# Patient Record
Sex: MALE | Race: WHITE | NOT HISPANIC OR LATINO | Employment: FULL TIME | URBAN - METROPOLITAN AREA
[De-identification: names, ages, dates, MRNs, and addresses within clinical notes are randomized per-mention and may not be internally consistent; named-entity substitution may affect disease eponyms.]

---
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
## 2021-01-27 ENCOUNTER — HOSPITAL ENCOUNTER (EMERGENCY)
Facility: MEDICAL CENTER | Age: 21
End: 2021-01-28
Attending: EMERGENCY MEDICINE
Payer: MEDICAID

## 2021-01-27 DIAGNOSIS — R51.9 ACUTE NONINTRACTABLE HEADACHE, UNSPECIFIED HEADACHE TYPE: ICD-10-CM

## 2021-01-27 PROCEDURE — 99284 EMERGENCY DEPT VISIT MOD MDM: CPT

## 2021-01-27 PROCEDURE — U0005 INFEC AGEN DETEC AMPLI PROBE: HCPCS

## 2021-01-27 PROCEDURE — U0003 INFECTIOUS AGENT DETECTION BY NUCLEIC ACID (DNA OR RNA); SEVERE ACUTE RESPIRATORY SYNDROME CORONAVIRUS 2 (SARS-COV-2) (CORONAVIRUS DISEASE [COVID-19]), AMPLIFIED PROBE TECHNIQUE, MAKING USE OF HIGH THROUGHPUT TECHNOLOGIES AS DESCRIBED BY CMS-2020-01-R: HCPCS

## 2021-01-27 RX ORDER — ONDANSETRON 2 MG/ML
4 INJECTION INTRAMUSCULAR; INTRAVENOUS ONCE
Status: COMPLETED | OUTPATIENT
Start: 2021-01-28 | End: 2021-01-28

## 2021-01-27 RX ORDER — SODIUM CHLORIDE 9 MG/ML
1000 INJECTION, SOLUTION INTRAVENOUS ONCE
Status: COMPLETED | OUTPATIENT
Start: 2021-01-28 | End: 2021-01-28

## 2021-01-27 RX ORDER — METOCLOPRAMIDE HYDROCHLORIDE 5 MG/ML
10 INJECTION INTRAMUSCULAR; INTRAVENOUS ONCE
Status: COMPLETED | OUTPATIENT
Start: 2021-01-28 | End: 2021-01-28

## 2021-01-27 RX ORDER — ACETAMINOPHEN 325 MG/1
650 TABLET ORAL ONCE
Status: COMPLETED | OUTPATIENT
Start: 2021-01-28 | End: 2021-01-28

## 2021-01-28 ENCOUNTER — APPOINTMENT (OUTPATIENT)
Dept: RADIOLOGY | Facility: MEDICAL CENTER | Age: 21
End: 2021-01-28
Attending: EMERGENCY MEDICINE
Payer: MEDICAID

## 2021-01-28 VITALS
HEART RATE: 74 BPM | OXYGEN SATURATION: 94 % | DIASTOLIC BLOOD PRESSURE: 66 MMHG | TEMPERATURE: 97.5 F | SYSTOLIC BLOOD PRESSURE: 112 MMHG | BODY MASS INDEX: 25.52 KG/M2 | RESPIRATION RATE: 16 BRPM | HEIGHT: 71 IN

## 2021-01-28 LAB
SARS-COV-2 RNA RESP QL NAA+PROBE: NOTDETECTED
SPECIMEN SOURCE: NORMAL

## 2021-01-28 PROCEDURE — 96374 THER/PROPH/DIAG INJ IV PUSH: CPT

## 2021-01-28 PROCEDURE — 700111 HCHG RX REV CODE 636 W/ 250 OVERRIDE (IP): Performed by: EMERGENCY MEDICINE

## 2021-01-28 PROCEDURE — 700102 HCHG RX REV CODE 250 W/ 637 OVERRIDE(OP): Performed by: EMERGENCY MEDICINE

## 2021-01-28 PROCEDURE — 70450 CT HEAD/BRAIN W/O DYE: CPT

## 2021-01-28 PROCEDURE — 96375 TX/PRO/DX INJ NEW DRUG ADDON: CPT

## 2021-01-28 PROCEDURE — 700105 HCHG RX REV CODE 258: Performed by: EMERGENCY MEDICINE

## 2021-01-28 PROCEDURE — A9270 NON-COVERED ITEM OR SERVICE: HCPCS | Performed by: EMERGENCY MEDICINE

## 2021-01-28 RX ADMIN — ACETAMINOPHEN 650 MG: 325 TABLET, FILM COATED ORAL at 00:00

## 2021-01-28 RX ADMIN — ONDANSETRON 4 MG: 2 INJECTION INTRAMUSCULAR; INTRAVENOUS at 00:05

## 2021-01-28 RX ADMIN — METOCLOPRAMIDE 10 MG: 5 INJECTION, SOLUTION INTRAMUSCULAR; INTRAVENOUS at 00:05

## 2021-01-28 RX ADMIN — SODIUM CHLORIDE 1000 ML: 9 INJECTION, SOLUTION INTRAVENOUS at 00:05

## 2021-01-28 NOTE — ED PROVIDER NOTES
"ED Provider Note    Scribed for Murtaza Lanier M.D. by Livia Jennings. 1/27/2021, 11:44 PM.    Primary care provider: Pcp Pt States None  Means of arrival: Walk-in  History obtained from: Patient  History limited by: None    CHIEF COMPLAINT  Chief Complaint   Patient presents with   • Headache       HPI  Manny Cerna is a 20 y.o. male who presents to the Emergency Department for evaluation of an acute headache onset last night. The pain has been worsening since it started. He describes seeing \"spots and squiggly lines\" throughout the last several hours. He endorses associated blurred vision, photophobia, nausea, fatigue, but denies cough, sore throat, abdominal pain, shortness of breath, vomiting, difficulty speaking, extremity weakness. Light exacerbates the pain. He has had a migraine in the past but this feels different. No familial history of migraine. No known COVID exposure.    REVIEW OF SYSTEMS  Pertinent positives include headache, blurred vision, photophobia, nausea, and fatigue. Pertinent negatives include no cough, sore throat, abdominal pain, shortness of breath, vomiting, difficulty speaking, extremity weakness. All other systems negative.    PAST MEDICAL HISTORY   has a past medical history of Bipolar 1 disorder (HCC), Depressed, and Psychiatric disorder.    SURGICAL HISTORY  patient denies any surgical history    SOCIAL HISTORY  Social History     Tobacco Use   • Smoking status: Never Smoker   • Smokeless tobacco: Former User   Substance Use Topics   • Alcohol use: No   • Drug use: No      Social History     Substance and Sexual Activity   Drug Use No       FAMILY HISTORY  History reviewed. No pertinent family history.    CURRENT MEDICATIONS  Current Outpatient Medications   Medication Instructions   • ARIPiprazole (ABILIFY) 10 mg, Oral, DAILY   • methylphenidate (CONCERTA) 27 mg, Oral, EVERY MORNING   • OLANZapine (ZYPREXA PO) Oral   • sertraline (ZOLOFT) 100 mg, Oral, DAILY " "      ALLERGIES  No Known Allergies    PHYSICAL EXAM  VITAL SIGNS: /89   Pulse 86   Temp 36.6 °C (97.8 °F) (Temporal)   Resp 16   Ht 1.803 m (5' 11\")   SpO2 96%   BMI 25.52 kg/m²     Constitutional: Well developed, Well nourished, mild distress.   HENT: Normocephalic, Atraumatic, mask in place.  Eyes: Conjunctiva normal, No discharge.   Neck: Supple, No stridor, no meningismus  Cardiovascular: Normal heart rate, Normal rhythm, No murmurs, equal pulses.   Pulmonary: Normal breath sounds, No respiratory distress, No wheezing, No rales, No rhonchi.  Chest: No chest wall tenderness or deformity.   Abdomen: Soft, No tenderness, No masses, no rebound, no guarding.   Back: No CVA tenderness.   Musculoskeletal: No major deformities noted, No tenderness.   Skin: Warm, Dry, No erythema, No rash.   Neurologic: No facial droop, 5/5 strength in upper and lower extremities. Alert & oriented x 3, Normal motor function,  No focal deficits noted. Normal finger to nose, Normal cranial nerves II-XII, No pronator drift. Equal strength in upper and lower extremities bilaterally.  Psychiatric: Affect normal, Judgment normal, Mood normal.     LABS  Results for orders placed or performed during the hospital encounter of 01/27/21   SARS-CoV-2 PCR (24 hour In-House): Collect NP swab in VTM    Specimen: Respirate   Result Value Ref Range    SARS-CoV-2 Source NP Swab      All labs reviewed by me.    CT-HEAD W/O   Final Result         1.  No acute intracranial abnormality.          COURSE & MEDICAL DECISION MAKING  Pertinent Labs & Imaging studies reviewed. (See chart for details)    PPE Note: I verified that the patient was wearing a mask and I was wearing appropriate PPE every time I entered the room. The patient's mask was on the patient at all times during my encounter except for a brief view of the oropharynx.     Scribe remained outside the patient's room and did not have any contact with the patient for the duration of " patient encounter.     11:44 PM - Patient seen and examined at bedside. Informed patient that he has a migraine and imaging is not necessary at this time. Patient will be treated with Zofran, Moses, and Tylenol. The patient will receive IV fluids for migraine. Ordered SARS-Cov2 to evaluate his symptoms. The differential diagnoses include but are not limited to: migraine, COVID     Reevaluated patient 00:40 patient still states his headache is only down to an 8 out of 10.    There was a positive response to IV fluids after patient reevaluation.    Patient was reevaluated at 2:25 states his headache is gone.  He would like to go home.  Discussed CT results with him that the CT is negative.    Medical Decision Making: Patient presents with a headache with worsening over the last 2 days.  He said it was gradual in onset.  He did have what sounds like a possible aura although it did not start with the initial onset of the headache.  Patient was treated with migraine cocktail with that his headache is gone.  Patient initially did not have much relief from the migraine cocktail given the fact that he stated that he had some visual changes CT of the head was done to make sure he did not have an intracranial hemorrhage or mass.  This is negative.  I do not suspect he has a subarachnoid hemorrhage as headache is not thunderclap in nature.  Patient has no neck stiffness does not have a fever I do not suspect he has meningitis.  We do have a high amount of COVID-19 in the community which can cause significant headaches therefore patient will be tested for COVID-19.     The patient will return for new or worsening symptoms and is stable at the time of discharge.    The patient is referred to a primary physician for blood pressure management, diabetic screening, and for all other preventative health concerns.        DISPOSITION:  Patient will be discharged home in stable condition.    FOLLOW UP:  Your doctor    Schedule an  appointment as soon as possible for a visit   As needed    [address] [address] [phone]    If you need a doctor    [address] [address] [phone]    If you need a doctor      OUTPATIENT MEDICATIONS:  New Prescriptions    No medications on file        FINAL IMPRESSION  1. Acute nonintractable headache, unspecified headache type          I, Livia Jennings (Scribe), am scribing for, and in the presence of, Murtaza Lanier M.D.    Electronically signed by: Livia Jennings (Dejanibnereida), 1/27/2021    IMurtaza M.D. personally performed the services described in this documentation, as scribed by Livia Jennings in my presence, and it is both accurate and complete.      The note accurately reflects work and decisions made by me.  Murtaza Lanier M.D.  1/28/2021  2:28 AM

## 2021-01-28 NOTE — ED TRIAGE NOTES
Headache since last night. Slight blurred vision bilat, photophobia and nausea.  Tried motrin, sleep, food and increasing po fluids without improvement.

## 2021-01-28 NOTE — DISCHARGE INSTRUCTIONS
Return if you have new or different headache, confusion, neck stiffness, fever, weakness, vision changes or difficulty speaking.  Your COVID-19 test should be back and 24 hours you will have to check the [facility] niels for your results.  Tell it is back you should home quarantine.

## 2021-01-28 NOTE — ED NOTES
Pt aox4, skin pink warm and dry, airway patent, rr even and unlabored, nad noted. No new complaints. Pt vss. Ambulates upon discharge without new incident or distress.

## 2021-01-29 ENCOUNTER — APPOINTMENT (OUTPATIENT)
Dept: RADIOLOGY | Facility: MEDICAL CENTER | Age: 21
End: 2021-01-29
Attending: EMERGENCY MEDICINE
Payer: MEDICAID

## 2021-01-29 ENCOUNTER — HOSPITAL ENCOUNTER (EMERGENCY)
Facility: MEDICAL CENTER | Age: 21
End: 2021-01-29
Attending: EMERGENCY MEDICINE
Payer: MEDICAID

## 2021-01-29 VITALS
RESPIRATION RATE: 18 BRPM | WEIGHT: 194.22 LBS | OXYGEN SATURATION: 98 % | BODY MASS INDEX: 27.19 KG/M2 | TEMPERATURE: 98.9 F | DIASTOLIC BLOOD PRESSURE: 59 MMHG | HEIGHT: 71 IN | SYSTOLIC BLOOD PRESSURE: 110 MMHG | HEART RATE: 86 BPM

## 2021-01-29 DIAGNOSIS — S59.911A INJURY OF RIGHT FOREARM, INITIAL ENCOUNTER: ICD-10-CM

## 2021-01-29 PROCEDURE — 99283 EMERGENCY DEPT VISIT LOW MDM: CPT | Mod: EDC

## 2021-01-29 PROCEDURE — 302874 HCHG BANDAGE ACE 2 OR 3"": Mod: EDC

## 2021-01-29 PROCEDURE — 29125 APPL SHORT ARM SPLINT STATIC: CPT | Mod: EDC

## 2021-01-29 PROCEDURE — 73090 X-RAY EXAM OF FOREARM: CPT | Mod: RT

## 2021-01-29 ASSESSMENT — LIFESTYLE VARIABLES
DO YOU DRINK ALCOHOL: NO
DOES PATIENT WANT TO STOP DRINKING: NO

## 2021-01-29 NOTE — ED NOTES
"[facility]    Discharge instructions, which include signs and symptoms to monitor patient for, as well as detailed information regarding forearm injury provided.  All questions and concerns addressed at this time. Encouraged patient to schedule a follow- up appointment with ortho.   Pt's splint checked and approved by ERP prior to discharge, CMS+.    Patient leaves ER in no apparent distress. Provided education regarding returning to the ER for any new concerns or changes in patient's condition.      /59   Pulse 86   Temp 37.2 °C (98.9 °F) (Temporal)   Resp 18   Ht 1.803 m (5' 11\")   Wt 88.1 kg (194 lb 3.6 oz)   SpO2 98%   BMI 27.09 kg/m²     "

## 2021-01-29 NOTE — ED PROVIDER NOTES
ED Provider Note    CHIEF COMPLAINT  Chief Complaint   Patient presents with   • Wrist Pain     right wrist since last night; pt states he was boxing as part of his workout when he started to experiece increased pain, pt denies swelling. Pt denies taking any medicaiton for pain        HPI  Manny Cerna is a 20 y.o. male who presents to the emergency department with pain over the distal aspect of the ulna just above the right wrist after punching a wal last night l.  The patient says that he was wearing boxing gloves and was shadow boxing but he did punch the wall with his right hand and has been persistently uncomfortable since then.  The pain is located just proximal to the wrist over the distal lateral ulnar area.  No elbow pain no pains in the fingers    REVIEW OF SYSTEMS no other injury or mechanism    PAST MEDICAL HISTORY  Past Medical History:   Diagnosis Date   • Bipolar 1 disorder (HCC)    • Depressed    • Psychiatric disorder     Inpatient psych care.        FAMILY HISTORY  History reviewed. No pertinent family history.    SOCIAL HISTORY  Social History     Socioeconomic History   • Marital status: Single     Spouse name: Not on file   • Number of children: Not on file   • Years of education: Not on file   • Highest education level: Not on file   Occupational History   • Not on file   Social Needs   • Financial resource strain: Not on file   • Food insecurity     Worry: Not on file     Inability: Not on file   • Transportation needs     Medical: Not on file     Non-medical: Not on file   Tobacco Use   • Smoking status: Never Smoker   • Smokeless tobacco: Former User   Substance and Sexual Activity   • Alcohol use: No   • Drug use: No   • Sexual activity: Not on file   Lifestyle   • Physical activity     Days per week: Not on file     Minutes per session: Not on file   • Stress: Not on file   Relationships   • Social connections     Talks on phone: Not on file     Gets together: Not on file      "Attends [employer] service: Not on file     Active member of club or organization: Not on file     Attends meetings of clubs or organizations: Not on file     Relationship status: Not on file   • Intimate partner violence     Fear of current or ex partner: Not on file     Emotionally abused: Not on file     Physically abused: Not on file     Forced sexual activity: Not on file   Other Topics Concern   • Not on file   Social History Narrative   • Not on file       SURGICAL HISTORY  History reviewed. No pertinent surgical history.    CURRENT MEDICATIONS  Home Medications     Reviewed by Marguerite Joe R.N. (Registered Nurse) on 01/29/21 at 1047  Med List Status: Complete   Medication Last Dose Status        Patient Adebayo Taking any Medications                       ALLERGIES  No Known Allergies    PHYSICAL EXAM  VITAL SIGNS: /60   Pulse 85   Temp 36 °C (96.8 °F) (Temporal)   Resp 16   Ht 1.803 m (5' 11\")   Wt 88.1 kg (194 lb 3.6 oz)   SpO2 96%   BMI 27.09 kg/m²    Oxygen saturation is interpreted as adequate  Constitutional: Awake pleasant well-appearing individual in no distress  Musculoskeletal: No acute bony deformity but there is tenderness over the ulnar aspect of the distal forearm, not so much in the wrist but mild tenderness throughout the wrist and a little bit of discomfort over the ulnar aspect of the hand but quite minimal and no deformity or swelling over the metacarpals.  Neurologic: Moving all his fingers and thumb without difficulty    Radiology  DX-FOREARM RIGHT   Final Result      No radiographic evidence of acute traumatic injury.        MEDICAL DECISION MAKING and DISPOSITION  I reviewed the x-ray findings with the patient, I do not think that he has a typical boxer's fracture of the hand as the pain is more over the distal part of the forearm, I have ordered a volar splint for the right forearm to keep the area at rest and protected I have spoken to the patient about the possibility " of soft tissue or ligamentous injury or occult fracture and I have advised him to rest ice and elevate the hand and use Tylenol and Motrin for pain and call Dr. Ma's office today and arrange orthopedic recheck during the week next week.    IMPRESSION  1.  Injury to the ulnar aspect of the distal right forearm         Electronically signed by: Heriberto Bui M.D., 1/29/2021 12:25 PM

## 2021-01-29 NOTE — ED NOTES
Volar Splint to right arm. Soft padding applied x 3 to forearm, x 4 to bone prominences. ERP Notified and checked splint. No further concerns.

## 2021-01-29 NOTE — ED TRIAGE NOTES
Manny Chintan Vallesjustina  20 y.o. male  Chief Complaint   Patient presents with   • Wrist Pain     right wrist since last night; pt states he was boxing as part of his workout when he started to experiece increased pain, pt denies swelling. Pt denies taking any medicaiton for pain        Pt amb to triage with steady gait for above complaint.   Pt is alert and oriented, speaking in full sentences, follows commands and responds appropriately to questions. Resp are even and unlabored. No behavioral indicators of pain.   Pt placed in lobby. Pt educated on triage process. Pt encouraged to alert staff for any changes.

## 2021-01-29 NOTE — ED NOTES
Pt reports right wrist pain that radiates down the hand, no obvious swelling or deformity noted. Pt aware to remain NPO, CMS+

## 2021-04-27 ENCOUNTER — HOSPITAL ENCOUNTER (EMERGENCY)
Facility: MEDICAL CENTER | Age: 21
End: 2021-04-27
Attending: EMERGENCY MEDICINE
Payer: MEDICAID

## 2021-04-27 VITALS
HEIGHT: 71 IN | WEIGHT: 190 LBS | BODY MASS INDEX: 26.6 KG/M2 | OXYGEN SATURATION: 98 % | RESPIRATION RATE: 16 BRPM | TEMPERATURE: 99 F | SYSTOLIC BLOOD PRESSURE: 118 MMHG | HEART RATE: 80 BPM | DIASTOLIC BLOOD PRESSURE: 55 MMHG

## 2021-04-27 DIAGNOSIS — S51.812A LACERATION OF LEFT FOREARM, INITIAL ENCOUNTER: ICD-10-CM

## 2021-04-27 DIAGNOSIS — R45.851 SUICIDAL IDEATION: ICD-10-CM

## 2021-04-27 LAB
AMPHET UR QL SCN: NEGATIVE
BARBITURATES UR QL SCN: NEGATIVE
BENZODIAZ UR QL SCN: NEGATIVE
BZE UR QL SCN: NEGATIVE
CANNABINOIDS UR QL SCN: NEGATIVE
METHADONE UR QL SCN: NEGATIVE
OPIATES UR QL SCN: NEGATIVE
OXYCODONE UR QL SCN: NEGATIVE
PCP UR QL SCN: NEGATIVE
POC BREATHALIZER: 0 PERCENT (ref 0–0.01)
PROPOXYPH UR QL SCN: NEGATIVE

## 2021-04-27 PROCEDURE — 302970 POC BREATHALIZER: Performed by: EMERGENCY MEDICINE

## 2021-04-27 PROCEDURE — 90791 PSYCH DIAGNOSTIC EVALUATION: CPT

## 2021-04-27 PROCEDURE — 700111 HCHG RX REV CODE 636 W/ 250 OVERRIDE (IP): Performed by: EMERGENCY MEDICINE

## 2021-04-27 PROCEDURE — 90471 IMMUNIZATION ADMIN: CPT

## 2021-04-27 PROCEDURE — 99285 EMERGENCY DEPT VISIT HI MDM: CPT

## 2021-04-27 PROCEDURE — 80307 DRUG TEST PRSMV CHEM ANLYZR: CPT

## 2021-04-27 PROCEDURE — 304999 HCHG REPAIR-SIMPLE/INTERMED LEVEL 1

## 2021-04-27 PROCEDURE — 700101 HCHG RX REV CODE 250: Performed by: EMERGENCY MEDICINE

## 2021-04-27 PROCEDURE — 303353 HCHG DERMABOND SKIN ADHESIVE

## 2021-04-27 PROCEDURE — 90715 TDAP VACCINE 7 YRS/> IM: CPT | Performed by: EMERGENCY MEDICINE

## 2021-04-27 PROCEDURE — 304217 HCHG IRRIGATION SYSTEM

## 2021-04-27 RX ADMIN — TETRACAINE HCL 3 ML: 10 INJECTION SUBARACHNOID at 01:32

## 2021-04-27 RX ADMIN — CLOSTRIDIUM TETANI TOXOID ANTIGEN (FORMALDEHYDE INACTIVATED), CORYNEBACTERIUM DIPHTHERIAE TOXOID ANTIGEN (FORMALDEHYDE INACTIVATED), BORDETELLA PERTUSSIS TOXOID ANTIGEN (GLUTARALDEHYDE INACTIVATED), BORDETELLA PERTUSSIS FILAMENTOUS HEMAGGLUTININ ANTIGEN (FORMALDEHYDE INACTIVATED), BORDETELLA PERTUSSIS PERTACTIN ANTIGEN, AND BORDETELLA PERTUSSIS FIMBRIAE 2/3 ANTIGEN 0.5 ML: 5; 2; 2.5; 5; 3; 5 INJECTION, SUSPENSION INTRAMUSCULAR at 01:32

## 2021-04-27 NOTE — DISCHARGE PLANNING
Medical Social Work    Referral: Legal Hold    Intervention: Legal Hold Paperwork given to SW by [employer] RN: Zoraida    Legal Hold Initiated: Date: 04/27/2021  Time: 0151    Legal Hold faxed: Date: 04/27/2021  Time: 0304    Patient’s Insurance Listed on Face Sheet: [place] [employer]    Referrals sent to: [facility]    Plan: Patient will transfer to mental health facility once acceptance is obtained.

## 2021-04-27 NOTE — ED PROVIDER NOTES
"ED Provider Note    CHIEF COMPLAINT  Chief Complaint   Patient presents with   • Laceration   • Psych Eval        HPI    Primary care provider: Pcp Pt States None   History obtained from: Patient and staff from his group home  History limited by: None     Manny Cerna is a 20 y.o. male who presents to the ED by EMS from his group home due to laceration to the left forearm shortly prior to arrival.  Patient states that he used a scissor to cut his forearm because \"I was under stress.\"  He has cut himself in the past.  He denies any other acts of self-harm today.  He denies drug or alcohol use.  He denies any weakness or sensory change.  He denies fever/chills/congestion/sore throat/cough/shortness of breath or difficulty breathing/chest pain/abdominal pain/nausea/vomiting/diarrhea/dysuria.  He denies homicidal ideation.  He has been in the group home for about 2 months.    REVIEW OF SYSTEMS  Please see HPI for pertinent positives/negatives.  All other systems reviewed and are negative.     PAST MEDICAL HISTORY  Past Medical History:   Diagnosis Date   • Bipolar 1 disorder (HCC)    • Depressed    • Psychiatric disorder     Inpatient psych care.         SURGICAL HISTORY  History reviewed. No pertinent surgical history.     SOCIAL HISTORY  Social History     Tobacco Use   • Smoking status: Never Smoker   • Smokeless tobacco: Former User   Substance and Sexual Activity   • Alcohol use: No   • Drug use: No   • Sexual activity: Not on file        FAMILY HISTORY  History reviewed. No pertinent family history.     CURRENT MEDICATIONS  Home Medications    **Home medications have not yet been reviewed for this encounter**          ALLERGIES  No Known Allergies     PHYSICAL EXAM  VITAL SIGNS: /93   Pulse 65   Temp 36.6 °C (97.8 °F) (Temporal)   Resp 16   Ht 1.803 m (5' 11\")   Wt 86.2 kg (190 lb)   SpO2 97%   BMI 26.50 kg/m²  @TACHO[[id number]::@     Pulse ox interpretation: 97% I interpret this pulse ox as normal "     Constitutional: Well developed, well nourished, alert in no apparent distress, nontoxic appearance    HENT: No external signs of trauma, normocephalic, mask on due to COVID-19 pandemic  Eyes: PERRL, conjunctiva without erythema, no discharge, no icterus    Neck: Soft and supple, trachea midline, no stridor, no tenderness, no LAD, no JVD, good ROM    Cardiovascular: Regular rate and rhythm, no murmurs/rubs/gallops, strong distal pulses and good perfusion    Thorax & Lungs: No respiratory distress, CTAB   Abdomen: Soft, nontender, nondistended, no guarding, no rebound, normal BS    Back: No CVAT    Extremities: No cyanosis, several linear superficial lacerations on left forearm with mild tenderness to palpation without crepitus/fluctuance/streaking, no edema, no gross deformity, good ROM, sensation intact to touch throughout, distal 5/5 strength, intact distal pulses with brisk cap refill    Skin: Warm, dry, no pallor/cyanosis, no rash noted    Lymphatic: No lymphadenopathy noted    Neuro: Alert and oriented to person, place, and time.  GCS 15.  CN II-XII grossly intact.  Normal speech.  Equal strength bilateral UE/LE.  Sensation intact to touch.  Psychiatric: Cooperative, normal mood and affect, no signs of active hallucinations or delusions      DIAGNOSTIC STUDIES / PROCEDURES    Verbal consent was obtained for laceration repair.  The wound was locally infiltrated with LET then irrigated with NS.  Wound was explored without evidence of FB.  The lacerations were closed using Dermabond.  Pt tolerated procedure well without complications.  Pt instructed on S/S of infection.    Total repaired wound length: 6 cm.      Tetanus updated in the ED      LABS  All labs reviewed by me.     Results for orders placed or performed during the hospital encounter of 04/27/21   POC BREATHALIZER   Result Value Ref Range    POC Breathalizer 0.00 0.00 - 0.01 Percent        RADIOLOGY  The radiologist's interpretation of all  radiological studies have been reviewed by me.     No orders to display          COURSE & MEDICAL DECISION MAKING  Nursing notes, VS, PMSFHx reviewed in chart.     Review of past medical records shows the patient was last seen in this ED January 29, 2021 for pain after punching a wall.      Differential diagnoses considered include but are not limited to: Suicidal ideation/attempt, anxiety, depression, schizophrenia, personality disorder, lacerations      History and physical exam as above.  Patient with self-inflicted lacerations to his left forearm.  These lacerations are mostly superficial and 3 of the lacerations were closed using Dermabond as above without complications.  No evidence for significant neurovascular or tendon injury.  Patient received a tetanus shot in the ED.  Patient was evaluated by mental health and placed on legal hold.  He has otherwise been cooperative during his ED stay and noted to be in no acute distress and nontoxic in appearance.  No other significant medical issues and patient is medically cleared.  Patient will continue to be monitored in the ED while awaiting psychiatric placement.      The patient is referred to a primary physician for blood pressure management, diabetic screening, and for all other preventative health concerns.       FINAL IMPRESSION  1. Suicidal ideation Acute   2. Laceration of left forearm, initial encounter Acute          DISPOSITION  Patient will continue to be monitored in the ED while awaiting psychiatric placement      FOLLOW UP  No follow-up provider specified.       OUTPATIENT MEDICATIONS  New Prescriptions    No medications on file          Electronically signed by: Sukhwinder Gaitan D.O., 4/27/2021 12:32 AM      Portions of this record were made with voice recognition software.  Despite my review, spelling/grammar/context errors may still remain.  Interpretation of this chart should be taken in this context.

## 2021-04-27 NOTE — ED NOTES
JOSE at bedside for pt transfer to [facility].  Paper work and belongings given to [facility].  Pt ambulated out with [facility] with steady gait.

## 2021-04-27 NOTE — DISCHARGE PLANNING
Medical Social Work    Referral: Legal hold Transfer to Mental Health Facility    Intervention: WESLEY received call from Leeanne at [facility] stating that Dr. Casper has accepted the patient for admission.  Leeanne requested transport be arranged for 0530.    WESLEY arranged for transportation to be set up through [place] with JOSE.    Pt has transport benefit through [facility]; arranged with Danyelle at [facility].     The pt will be picked up at 0530.     WESLEY notified the RN of the departure time as well as accepting facility.     WESLEY created transfer packet and placed on chart. Original Legal Hold placed in packet.     Plan: Pt will transfer to [facility] at 0530.

## 2021-04-27 NOTE — DISCHARGE PLANNING
Medical Social Work    MSW received a text from RN that pt is still here.  MSW contacted Elder with [facility] who states that pt should be picked up around 30-45 more minutes.  Bedside RN and Zac at [facility] to update on delay.

## 2021-04-27 NOTE — ED NOTES
Break RN: Alert RN eval completed at bedside. Pt placed on L2k for self harm. Group home staff no longer at bedside, 1:1 sitter in direct view of patient.
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
## 2021-04-27 NOTE — ED TRIAGE NOTES
"Chief Complaint   Patient presents with   • Laceration   • Psych Eval   BIBA from a youth parole home for self inflicted lacerations to left forearm. Pt denies SI/HI but reports hx of depression and states  \"it's built up and got to him today and he didn't know how to express it better at the time.\" Pt reports feelings starting about 3 days ago. Pt reports using scissors and has about 5-6 superficial and 2 deeper lacerations noted. Unsure of last tetanus shot  "

## 2021-04-27 NOTE — ED PROVIDER NOTES
ED PROVIDER NOTE    Scribed for No att. providers found by Sweetie Anderson. 4/27/2021, 6:32 AM.    This is an addendum to the note on Manny Cerna. For further details and full chart entry, see the previously signed ED Provider Note written by Dr. Gaitan (ERP).      6:32 AM - I discussed the patient's case with Dr. Gaitan (ERP) who will transfer care of the patient to me at this time.        7:53 AM - The patient's continuing management included transfer to a Psychiatric facility. Patient was reevaluated and is resting comfortable.      FINAL IMPRESSION   1. Suicidal ideation Acute   2. Laceration of left forearm, initial encounter Acute        I, Sweetie Anderson (Dejanibe), am scribing for, and in the presence of, No att. providers found.    Electronically signed by: Sweetie Anderson (Dejanibnereida), 4/27/2021    I, No att. providers found personally performed the services described in this documentation, as scribed by Sweetie Anderson in my presence, and it is both accurate and complete.    The note accurately reflects work and decisions made by me.  Isidro Lopez D.O.  4/27/2021  1:21 PM
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
## 2023-02-16 ENCOUNTER — HOSPITAL ENCOUNTER (EMERGENCY)
Facility: MEDICAL CENTER | Age: 23
End: 2023-02-16
Attending: EMERGENCY MEDICINE
Payer: COMMERCIAL

## 2023-02-16 VITALS
RESPIRATION RATE: 18 BRPM | DIASTOLIC BLOOD PRESSURE: 67 MMHG | BODY MASS INDEX: 26.39 KG/M2 | TEMPERATURE: 98.7 F | HEART RATE: 68 BPM | WEIGHT: 188.49 LBS | SYSTOLIC BLOOD PRESSURE: 144 MMHG | HEIGHT: 71 IN | OXYGEN SATURATION: 96 %

## 2023-02-16 DIAGNOSIS — S05.91XA RIGHT EYE INJURY, INITIAL ENCOUNTER: ICD-10-CM

## 2023-02-16 PROCEDURE — A9270 NON-COVERED ITEM OR SERVICE: HCPCS | Performed by: EMERGENCY MEDICINE

## 2023-02-16 PROCEDURE — 99283 EMERGENCY DEPT VISIT LOW MDM: CPT

## 2023-02-16 PROCEDURE — 700101 HCHG RX REV CODE 250: Performed by: EMERGENCY MEDICINE

## 2023-02-16 PROCEDURE — 700102 HCHG RX REV CODE 250 W/ 637 OVERRIDE(OP): Performed by: EMERGENCY MEDICINE

## 2023-02-16 RX ORDER — ACETAMINOPHEN 325 MG/1
650 TABLET ORAL ONCE
Status: COMPLETED | OUTPATIENT
Start: 2023-02-16 | End: 2023-02-16

## 2023-02-16 RX ORDER — PROPARACAINE HYDROCHLORIDE 5 MG/ML
1 SOLUTION/ DROPS OPHTHALMIC ONCE
Status: COMPLETED | OUTPATIENT
Start: 2023-02-16 | End: 2023-02-16

## 2023-02-16 RX ORDER — IBUPROFEN 600 MG/1
600 TABLET ORAL ONCE
Status: COMPLETED | OUTPATIENT
Start: 2023-02-16 | End: 2023-02-16

## 2023-02-16 RX ADMIN — PROPARACAINE HYDROCHLORIDE 1 DROP: 5 SOLUTION/ DROPS OPHTHALMIC at 16:19

## 2023-02-16 RX ADMIN — FLUORESCEIN SODIUM 1 MG: 1 STRIP OPHTHALMIC at 16:19

## 2023-02-16 RX ADMIN — IBUPROFEN 600 MG: 600 TABLET, FILM COATED ORAL at 16:33

## 2023-02-16 RX ADMIN — ACETAMINOPHEN 650 MG: 325 TABLET, FILM COATED ORAL at 16:33

## 2023-02-16 ASSESSMENT — LIFESTYLE VARIABLES: DO YOU DRINK ALCOHOL: NO

## 2023-02-16 NOTE — LETTER
"  FORM C-4:  EMPLOYEE’S CLAIM FOR COMPENSATION/ REPORT OF INITIAL TREATMENT  EMPLOYEE’S CLAIM - PROVIDE ALL INFORMATION REQUESTED   First Name Manny Last Name Nehemiah Birthdate 2000  Sex male Claim Number   Home Address [address]   [place]             Zip [address]                                   Age  22 y.o. Height  1.803 m (5' 11\") Weight  85.5 kg (188 lb 7.9 oz) N  [id number]   Mailing Address [address]  [place]              Zip [address] Telephone  [phone] (home)  Primary Language Spoken  English    Insurer  [employer]   [employer]Expl[employer].To Yellow Pages[employer]IRANDA [employer] Employee's Occupation (Job Title) When Injury or Occupational Disease Occurred     Employer's Name [employer]     Employer Address [address] [place] [address]] Zip [address]   Date of Injury  1/16/2023       Hour of Injury  12:15 PM Date Employer Notified  1/16/2023 Last Day of Work after Injury or Occupational Disease  1/16/2023 Supervisor to Whom Injury Reported  Steven   Address or Location of Accident (if applicable) Work [1]   What were you doing at the time of accident? (if applicable) Cutting pallet scraps    How did this injury or occupational disease occur? Be specific and answer in detail. Use additional sheet if necessary)  I was cutting plastic scraps to a apallet and one had came up and gotten me in the eye   If you believe that you have an occupational disease, when did you first have knowledge of the disability and it relationship to your employment? N/A Witnesses to the Accident  Camera   Nature of Injury or Occupational Disease  Workers' Compensation Part(s) of Body Injured or Affected  Eye (R), N/A, Eye (R)    I CERTIFY THAT THE ABOVE IS TRUE AND CORRECT TO THE BEST OF MY KNOWLEDGE AND THAT I HAVE PROVIDED THIS INFORMATION IN ORDER TO OBTAIN THE BENEFITS OF [place] AND " [place]CC[place] IN[place]IVE OR CHAPTER 617 OF [place]).  I HEREBY AUTHORIZE ANY PHYSICIAN, CHIROPRACTOR, SURGEON, PRACTITIONER, OR OTHER PERSON, ANY HOSPITAL, INCLUDING [facility] OR [facility], ANY MEDICAL SERVICE ORGANIZATION, ANY INSURANCE COMPANY, OR OTHER INSTITUTION OR ORGANIZATION TO RELEASE TO EACH OTHER, ANY MEDICAL OR OTHER INFORMATION, INCLUDING BENEFITS PAID OR PAYABLE, PERTINENT TO THIS INJURY OR DISEASE, EXCEPT INFORMATION RELATIVE TO DIAGNOSIS, TREATMENT AND/OR COUNSELING FOR AIDS, PSYCHOLOGICAL CONDITIONS, ALCOHOL OR CONTROLLED SUBSTANCES, FOR WHICH I MUST GIVE SPECIFIC AUTHORIZATION.  A PHOTOSTAT OF THIS AUTHORIZATION SHALL BE AS VALID AS THE ORIGINAL.  Date                                      Place                                                                             Employee’s Signature   THIS REPORT MUST BE COMPLETED AND MAILED WITHIN 3 WORKING DAYS OF TREATMENT   Place [facility], EMERGENCY DEPT                       Name of Facility [facility]   Date  2/16/2023 Diagnosis  (S05.91XA) Right eye injury, initial encounter Is there evidence the injured employee was under the influence of alcohol and/or another controlled substance at the time of accident?   Hour  4:53 PM Description of Injury or Disease  Right eye injury, initial encounter No   Treatment  Tylenol and Motrin  Have you advised the patient to remain off work five days or more?         No   X-Ray Findings    If Yes   From Date    To Date      From information given by the employee, together with medical evidence, can you directly connect this injury or occupational disease as job incurred? Yes If No, is employee capable of: Full Duty  Yes Modified Duty      Is additional medical care by a physician indicated?   Comments:Patient can follow-up with ophthalmology if his symptoms persist If Modified Duty, Specify any Limitations / Restrictions      "  Do you know of any previous injury or disease contributing to this condition or occupational disease? No    Date 2/16/2023 Print Doctor’s Name Willow Diallo certify the employer’s copy of this form was mailed on:   Address [address] [place] [address]  [place] [id number] [phone] INSURER’S USE ONLY   Provider’s Tax ID Number [id number] Telephone Dept: [phone]    Doctor’s Signature nereida-WILLOW Rodriges M.D. Degree        Form C-4 (rev.10/07)                                                                         BRIEF DESCRIPTION OF RIGHTS AND BENEFITS  (Pursuant to NRS 616C.050)    Notice of Injury or Occupational Disease (Incident Report Form C-1): If an injury or occupational disease (OD) arises out of and in the course of employment, you must provide written notice to your employer as soon as practicable, but no later than 7 days after the accident or OD. Your employer shall maintain a sufficient supply of the required forms.    Claim for Compensation (Form C-4): If medical treatment is sought, the form C-4 is available at the place of initial treatment. A completed \"Claim for Compensation\" (Form C-4) must be filed within 90 days after an accident or OD. The treating physician or chiropractor must, within 3 working days after treatment, complete and mail to the employer, the employer's insurer and third-party , the Claim for Compensation.    Medical Treatment: If you require medical treatment for your on-the-job injury or OD, you may be required to select a physician or chiropractor from a list provided by your workers’ compensation insurer, if it has contracted with an Organization for Managed Care (MCO) or Preferred Provider Organization (PPO) or providers of health care. If your employer has not entered into a contract with an MCO or PPO, you may select a physician or chiropractor from the Panel of Physicians and Chiropractors. Any medical costs related to your industrial injury or OD will be " paid by your insurer.    Temporary Total Disability (TTD): If your doctor has certified that you are unable to work for a period of at least 5 consecutive days, or 5 cumulative days in a 20-day period, or places restrictions on you that your employer does not accommodate, you may be entitled to TTD compensation.    Temporary Partial Disability (TPD): If the wage you receive upon reemployment is less than the compensation for TTD to which you are entitled, the insurer may be required to pay you TPD compensation to make up the difference. TPD can only be paid for a maximum of 24 months.    Permanent Partial Disability (PPD): When your medical condition is stable and there is an indication of a PPD as a result of your injury or OD, within 30 days, your insurer must arrange for an evaluation by a rating physician or chiropractor to determine the degree of your PPD. The amount of your PPD award depends on the date of injury, the results of the PPD evaluation, your age and wage.    Permanent Total Disability (PTD): If you are medically certified by a treating physician or chiropractor as permanently and totally disabled and have been granted a PTD status by your insurer, you are entitled to receive monthly benefits not to exceed 66 2/3% of your average monthly wage. The amount of your PTD payments is subject to reduction if you previously received a lump-sum PPD award.    Vocational Rehabilitation Services: You may be eligible for vocational rehabilitation services if you are unable to return to the job due to a permanent physical impairment or permanent restrictions as a result of your injury or occupational disease.    Transportation and Per Char Reimbursement: You may be eligible for travel expenses and per char associated with medical treatment.    Reopening: You may be able to reopen your claim if your condition worsens after claim closure.     Appeal Process: If you disagree with a written determination issued by  the insurer or the insurer does not respond to your request, you may appeal to the Department of Administration, , by following the instructions contained in your determination letter. You must appeal the determination within 70 days from the date of the determination letter at [address], [address], [place], [place] [address], or [address], [place], [place] [address]. If you disagree with the  decision, you may appeal to the Department of Administration, . You must file your appeal within 30 days from the date of the  decision letter at [address], [address], [place], [place] [address], or [address], [address], [place], [place] [address]. If you disagree with a decision of an , you may file a petition for judicial review with the District Court. You must do so within 30 days of the Appeal Officer’s decision. You may be represented by an  at your own expense or you may contact the [facility] for possible representation.    [place]  for Injured Workers (NAIW): If you disagree with a  decision, you may request that NAIW represent you without charge at an  Hearing. For information regarding denial of benefits, you may contact the [facility] at: [address], [address], [place] [address], [phone], or [address], [address], [place] [address], [phone]    To File a Complaint with the Division: If you wish to file a complaint with the  of the Division of Industrial Relations (DIR),  please contact the Workers’ Compensation Section, [address] [address], [place], [place] [address], telephone [phone], or [address], [facility] [address], [place], [place] [address], telephone [phone].    For assistance with Workers’ Compensation Issues: You may contact the [facility] Office for Consumer Health  Assistance, [address] [place] [address], [facility] [address], [place] [address], Toll Free 1-677.979.2511, Web site: http://[facility].[place].gov/Programs/JONELLE E-mail: [email or web address]  D-2 (rev. 10/20)              __________________________________________________________________                                    _________________            Employee Name / Signature                                                                                                                            Date

## 2023-02-16 NOTE — LETTER
"  FORM C-4:  EMPLOYEE’S CLAIM FOR COMPENSATION/ REPORT OF INITIAL TREATMENT  EMPLOYEE’S CLAIM - PROVIDE ALL INFORMATION REQUESTED   First Name Manny Last Name Nehemiah Birthdate 2000  Sex male Claim Number   Home Address [address]    [place]             [place] [address]                                   Age  22 y.o. 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  Telephone     Employer Address [address] [place] [facility] [29] Zip [address]   Date of Injury  1/16/2023       Hour of Injury  12:15 PM Date Employer Notified  1/16/2023 Last Day of Work after Injury or Occupational Disease  1/16/2023 Supervisor to Whom Injury Reported  Steven   Address or Location of Accident (if applicable) Work [1]   What were you doing at the time of accident? (if applicable) Cutting pallet scraps    How did this injury or occupational disease occur? Be specific and answer in detail. Use additional sheet if necessary)  I was cutting plastic scraps to a apallet and one had came up and gotten me in the eye   If you believe that you have an occupational disease, when did you first have knowledge of the disability and it relationship to your employment? N/A Witnesses to the Accident  Camera   Nature of Injury or Occupational Disease  Workers' Compensation Part(s) of Body Injured or Affected  Eye (R), N/A, Eye (R)    I CERTIFY THAT THE ABOVE IS TRUE AND CORRECT TO THE BEST OF MY KNOWLEDGE AND THAT I HAVE PROVIDED THIS INFORMATION IN ORDER TO OBTAIN THE BENEFITS OF [place] AND [place] " DISEAS[place]S ([place] 616A [place]D, INCLUSIVE OR CHAPTER 617 OF [place]).  I HEREBY AUTHORIZE ANY PHYSICIAN, CHIROPRACTOR, SURGEON, PRACTITIONER, OR OTHER PERSON, ANY HOSPITAL, INCLUDING [facility] OR [facility], ANY MEDICAL SERVICE ORGANIZATION, ANY INSURANCE COMPANY, OR OTHER INSTITUTION OR ORGANIZATION TO RELEASE TO EACH OTHER, ANY MEDICAL OR OTHER INFORMATION, INCLUDING BENEFITS PAID OR PAYABLE, PERTINENT TO THIS INJURY OR DISEASE, EXCEPT INFORMATION RELATIVE TO DIAGNOSIS, TREATMENT AND/OR COUNSELING FOR AIDS, PSYCHOLOGICAL CONDITIONS, ALCOHOL OR CONTROLLED SUBSTANCES, FOR WHICH I MUST GIVE SPECIFIC AUTHORIZATION.  A PHOTOSTAT OF THIS AUTHORIZATION SHALL BE AS VALID AS THE ORIGINAL.  Date  2/16/23           [facility]           Employee’s Signature   THIS REPORT MUST BE COMPLETED AND MAILED WITHIN 3 WORKING DAYS OF TREATMENT   Place [facility], EMERGENCY DEPT                       Name of Facility [facility]   Date  2/16/2023 Diagnosis  (S05.91XA) Right eye injury, initial encounter Is there evidence the injured employee was under the influence of alcohol and/or another controlled substance at the time of accident?   Hour  4:58 PM Description of Injury or Disease  Right eye injury, initial encounter No   Treatment  Tylenol and Motrin  Have you advised the patient to remain off work five days or more?         No   X-Ray Findings    If Yes   From Date    To Date      From information given by the employee, together with medical evidence, can you directly connect this injury or occupational disease as job incurred? Yes If No, is employee capable of: Full Duty  Yes Modified Duty      Is additional medical care by a physician indicated?   Comments:Patient can follow-up with ophthalmology if his symptoms persist If Modified Duty, Specify any Limitations / Restrictions       Do you know of any previous injury or disease contributing to  "this condition or occupational disease? No    Date 2/16/2023 Print Doctor’s Name DialloWillow miller LISA certify the employer’s copy of this form was mailed on:   Address [address] [place] [address]  [place] [address] [phone] INSURER’S USE ONLY   Provider’s Tax ID Number [id number] Telephone Dept: [phone]    Doctor’s Signature e-WILLOW Rodriges M.D. Degree        Form C-4 (rev.10/07)                                                                         BRIEF DESCRIPTION OF RIGHTS AND BENEFITS  ([place]ursuant to [place]RS 616C.050)    Notice of Injury or Occupational Disease (Incident Report Form C-1): If an injury or occupational disease (OD) arises out of and in the course of employment, you must provide written notice to your employer as soon as practicable, but no later than 7 days after the accident or OD. Your employer shall maintain a sufficient supply of the required forms.    Claim for Compensation (Form C-4): If medical treatment is sought, the form C-4 is available at the place of initial treatment. A completed \"Claim for Compensation\" (Form C-4) must be filed within 90 days after an accident or OD. The treating physician or chiropractor must, within 3 working days after treatment, complete and mail to the employer, the employer's insurer and third-party , the Claim for Compensation.    Medical Treatment: If you require medical treatment for your on-the-job injury or OD, you may be required to select a physician or chiropractor from a list provided by your workers’ compensation insurer, if it has contracted with an Organization for Managed Care (MCO) or Preferred Provider Organization (PPO) or providers of health care. If your employer has not entered into a contract with an MCO or PPO, you may select a physician or chiropractor from the Panel of Physicians and Chiropractors. Any medical costs related to your industrial injury or OD will be paid by your insurer.    Temporary Total Disability (TTD): If your " doctor has certified that you are unable to work for a period of at least 5 consecutive days, or 5 cumulative days in a 20-day period, or places restrictions on you that your employer does not accommodate, you may be entitled to TTD compensation.    Temporary Partial Disability (TPD): If the wage you receive upon reemployment is less than the compensation for TTD to which you are entitled, the insurer may be required to pay you TPD compensation to make up the difference. TPD can only be paid for a maximum of 24 months.    Permanent Partial Disability (PPD): When your medical condition is stable and there is an indication of a PPD as a result of your injury or OD, within 30 days, your insurer must arrange for an evaluation by a rating physician or chiropractor to determine the degree of your PPD. The amount of your PPD award depends on the date of injury, the results of the PPD evaluation, your age and wage.    Permanent Total Disability (PTD): If you are medically certified by a treating physician or chiropractor as permanently and totally disabled and have been granted a PTD status by your insurer, you are entitled to receive monthly benefits not to exceed 66 2/3% of your average monthly wage. The amount of your PTD payments is subject to reduction if you previously received a lump-sum PPD award.    Vocational Rehabilitation Services: You may be eligible for vocational rehabilitation services if you are unable to return to the job due to a permanent physical impairment or permanent restrictions as a result of your injury or occupational disease.    Transportation and Per Char Reimbursement: You may be eligible for travel expenses and per char associated with medical treatment.    Reopening: You may be able to reopen your claim if your condition worsens after claim closure.     Appeal Process: If you disagree with a written determination issued by the insurer or the insurer does not respond to your request, you may  appeal to the Department of Administration, , by following the instructions contained in your determination letter. You must appeal the determination within 70 days from the date of the determination letter at [address], [address], [place], [place] [address], or [address] [facility] [address], [place], [place] [address]. If you disagree with the  decision, you may appeal to the Department of Administration, . You must file your appeal within 30 days from the date of the  decision letter at [address], [address], [place], [place] [address], or [address] [facility] [address], [place], [place] [address]. If you disagree with a decision of an , you may file a petition for judicial review with the District Court. You must do so within 30 days of the Appeal Officer’s decision. You may be represented by an  at your own expense or you may contact the [facility] for possible representation.    [place]  for Injured Workers ([place]IW): If you disagree with a  decision, you may request that NAIW represent you without charge at an  Hearing. For information regarding denial of benefits, you may contact the [facility] at: [address], [address], [place] [address], [phone], or [address], [address], [place] [address], [phone]    To File a Complaint with the Division: If you wish to file a complaint with the  of the Division of Industrial Relations (DIR),  please contact the Workers’ Compensation Section, [address], [address], [place], [place] [address], telephone (570) [phone], or [address], [address], [place], [place] [address], telephone [phone].    For assistance with Workers’ Compensation Issues: You may contact the [facility] Office for Consumer Health Assistance, [address], [address], [place], [place]  [address], [facility] Free 1-529.502.6634, Web site: http://[facility].[place].gov/Programs/JONELLE E-mail: [email or web address]  D-2 (rev. 10/20)              __________________________________________________________________                                    ___2/16/23______________            Employee Name / Signature                                                                                                                            Date

## 2023-02-16 NOTE — ED TRIAGE NOTES
"Chief Complaint   Patient presents with    Eye Injury     Hit in the right eye by a strap at work.  Blurred vision     /64   Pulse 68   Temp 37.1 °C (98.8 °F) (Temporal)   Resp 16   Ht 1.803 m (5' 11\")   Wt 85.5 kg (188 lb 7.9 oz)   SpO2 97%   Pt informed of wait times. Educated on triage process.  Asked to return to triage RN for any new or worsening of symptoms. Thanked for patience.      "

## 2023-02-17 NOTE — DISCHARGE INSTRUCTIONS
You were seen in the ER for eye pain.  Thankfully your exam is reassuring.  You are safe for discharge.  I have given you the contact information for our on-call eye doctor, call her office tomorrow if your symptoms worsen.  Try Tylenol and Motrin for pain.  Return with new or worsening symptoms.  I hope you feel better soon!

## 2023-02-17 NOTE — ED NOTES
Visual Acuity        (Injured Eye) Right Eye- 20/25                           Left Eye  - 20/20                           Both Eyes- 20/20

## 2023-02-17 NOTE — ED PROVIDER NOTES
"ED Provider Note    CHIEF COMPLAINT  Chief Complaint   Patient presents with    Eye Injury     Hit in the right eye by a strap at work.  Blurred vision       EXTERNAL RECORDS REVIEWED  Other none    HPI/ROS  LIMITATION TO HISTORY   Select: : None  OUTSIDE HISTORIAN(S):  None    Manny Cerna is a 22 y.o. male who presents with right eye pain and blurred vision that started earlier today when he was hit in the eye by a strap while at work.  He reports he was cutting a thick strap from around a pallet without eye protection when the strap broke and flicked him in the right eye.  He had immediate pain in the eye but has not taken any medication for his symptoms.  Initially he was able to only see outlines of objects but was having a difficult time seeing fine items such as the wrinkles on his hand.  It is being in the ER he notes that his vision has improved significantly.  He now reports a headache on the right side of his head where he was hit with a strap but otherwise has no additional complaints.    PAST MEDICAL HISTORY   has a past medical history of Asthma, Bipolar 1 disorder (HCC), Depressed, and Psychiatric disorder.    SURGICAL HISTORY  patient denies any surgical history    FAMILY HISTORY  History reviewed. No pertinent family history.    SOCIAL HISTORY  Social History     Tobacco Use    Smoking status: Never    Smokeless tobacco: Former   Substance and Sexual Activity    Alcohol use: No    Drug use: No    Sexual activity: Not on file       CURRENT MEDICATIONS  Home Medications       Reviewed by Alesia Govea R.N. (Registered Nurse) on 02/16/23 at 1423  Med List Status: Partial     Medication Last Dose Status        Patient Adebayo Taking any Medications                           ALLERGIES  No Known Allergies    PHYSICAL EXAM  VITAL SIGNS: /64   Pulse 68   Temp 37.1 °C (98.8 °F) (Temporal)   Resp 16   Ht 1.803 m (5' 11\")   Wt 85.5 kg (188 lb 7.9 oz)   SpO2 97%   BMI 26.29 kg/m²  "   Physical Exam  Vitals and nursing note reviewed.   Constitutional:       Appearance: Normal appearance.   HENT:      Head: Normocephalic and atraumatic.      Right Ear: External ear normal.      Left Ear: External ear normal.      Nose: Nose normal.      Mouth/Throat:      Mouth: Mucous membranes are moist.   Eyes:      Extraocular Movements: Extraocular movements intact.      Conjunctiva/sclera: Conjunctivae normal.      Pupils: Pupils are equal, round, and reactive to light.      Comments: Negative Olga sign.  No corneal abrasion.   Cardiovascular:      Rate and Rhythm: Normal rate.      Comments: Warm and well-perfused  Pulmonary:      Effort: Pulmonary effort is normal.   Musculoskeletal:         General: Normal range of motion.      Cervical back: Normal range of motion.   Skin:     General: Skin is warm and dry.   Neurological:      General: No focal deficit present.      Mental Status: He is alert and oriented to person, place, and time.   Psychiatric:         Mood and Affect: Mood normal.         Behavior: Behavior normal.     DIAGNOSTIC STUDIES / PROCEDURES    COURSE & MEDICAL DECISION MAKING    ED Observation Status? No; Patient does not meet criteria for ED Observation.     INITIAL ASSESSMENT, COURSE AND PLAN  Care Narrative: This is a 22-year-old male who is here from work after his right eye was struck by a strap that was cut while under pressure.  Here he has 20/25 vision in the right eye as compared to 20/20 vision in his left eye.  He reports his vision has improved significantly since the incident at which time he was largely only able to see shapes.  He does have a headache but no obvious cranial nerve deficits.  His pupils are equal, round, and reactive to light bilaterally.  He does have some very mild photophobia in the right eye.  His right eye was anesthetized with proparacaine and fluorescein was instilled.  There was a negative Olga sign under bluelight and no obvious corneal abrasion.   No indication for emergent ophthalmology consultation presently.  He was given Tylenol and ibuprofen.  I gave him the contact information for our on-call ophthalmologist and asked him to call tomorrow to schedule a follow-up appointment should his symptoms persist or worsen.  He was discharged in good and stable condition with strict return precautions.    ADDITIONAL PROBLEM LIST  Headache    DISPOSITION AND DISCUSSIONS  I have discussed management of the patient with the following physicians and AKASH's: None    Discussion of management with other QHP or appropriate source(s): None     Escalation of care considered, and ultimately not performed:diagnostic imaging    Barriers to care at this time, including but not limited to:  None .     Decision tools and prescription drugs considered including, but not limited to: None.    FINAL DIAGNOSIS  1. Right eye injury, initial encounter      Electronically signed by: Gabriel Diallo M.D., 2/16/2023 4:12 PM

## 2023-08-25 ENCOUNTER — APPOINTMENT (OUTPATIENT)
Dept: RADIOLOGY | Facility: MEDICAL CENTER | Age: 23
End: 2023-08-25
Attending: EMERGENCY MEDICINE

## 2023-08-25 ENCOUNTER — HOSPITAL ENCOUNTER (EMERGENCY)
Facility: MEDICAL CENTER | Age: 23
End: 2023-08-25
Attending: EMERGENCY MEDICINE
Payer: MEDICAID

## 2023-08-25 VITALS
WEIGHT: 188 LBS | OXYGEN SATURATION: 96 % | DIASTOLIC BLOOD PRESSURE: 46 MMHG | HEIGHT: 71 IN | HEART RATE: 59 BPM | RESPIRATION RATE: 14 BRPM | BODY MASS INDEX: 26.32 KG/M2 | TEMPERATURE: 97.5 F | SYSTOLIC BLOOD PRESSURE: 118 MMHG
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
## 2023-08-25 DIAGNOSIS — S50.02XA CONTUSION OF LEFT ELBOW, INITIAL ENCOUNTER: ICD-10-CM

## 2023-08-25 DIAGNOSIS — T07.XXXA MULTIPLE ABRASIONS: ICD-10-CM

## 2023-08-25 DIAGNOSIS — S20.212A RIB CONTUSION, LEFT, INITIAL ENCOUNTER: ICD-10-CM

## 2023-08-25 LAB
ABO GROUP BLD: NORMAL
ALBUMIN SERPL BCP-MCNC: 4.8 G/DL (ref 3.2–4.9)
ALBUMIN/GLOB SERPL: 2 G/DL
ALP SERPL-CCNC: 57 U/L (ref 30–99)
ALT SERPL-CCNC: 27 U/L (ref 2–50)
ANION GAP SERPL CALC-SCNC: 13 MMOL/L (ref 7–16)
AST SERPL-CCNC: 32 U/L (ref 12–45)
BILIRUB SERPL-MCNC: 0.8 MG/DL (ref 0.1–1.5)
BLD GP AB SCN SERPL QL: NORMAL
BUN SERPL-MCNC: 19 MG/DL (ref 8–22)
CALCIUM ALBUM COR SERPL-MCNC: 8.7 MG/DL (ref 8.5–10.5)
CALCIUM SERPL-MCNC: 9.3 MG/DL (ref 8.5–10.5)
CHLORIDE SERPL-SCNC: 102 MMOL/L (ref 96–112)
CO2 SERPL-SCNC: 22 MMOL/L (ref 20–33)
CREAT SERPL-MCNC: 1.05 MG/DL (ref 0.5–1.4)
ERYTHROCYTE [DISTWIDTH] IN BLOOD BY AUTOMATED COUNT: 41.8 FL (ref 35.9–50)
ETHANOL BLD-MCNC: <10.1 MG/DL
GFR SERPLBLD CREATININE-BSD FMLA CKD-EPI: 103 ML/MIN/1.73 M 2
GLOBULIN SER CALC-MCNC: 2.4 G/DL (ref 1.9–3.5)
GLUCOSE SERPL-MCNC: 108 MG/DL (ref 65–99)
HCT VFR BLD AUTO: 44.4 % (ref 42–52)
HGB BLD-MCNC: 15.8 G/DL (ref 14–18)
MCH RBC QN AUTO: 30.8 PG (ref 27–33)
MCHC RBC AUTO-ENTMCNC: 35.6 G/DL (ref 32.3–36.5)
MCV RBC AUTO: 86.5 FL (ref 81.4–97.8)
PLATELET # BLD AUTO: 366 K/UL (ref 164–446)
PMV BLD AUTO: 9.1 FL (ref 9–12.9)
POTASSIUM SERPL-SCNC: 4 MMOL/L (ref 3.6–5.5)
PROT SERPL-MCNC: 7.2 G/DL (ref 6–8.2)
RBC # BLD AUTO: 5.13 M/UL (ref 4.7–6.1)
RH BLD: NORMAL
SODIUM SERPL-SCNC: 137 MMOL/L (ref 135–145)
WBC # BLD AUTO: 5.7 K/UL (ref 4.8–10.8)

## 2023-08-25 PROCEDURE — 700101 HCHG RX REV CODE 250: Performed by: EMERGENCY MEDICINE

## 2023-08-25 PROCEDURE — 86900 BLOOD TYPING SEROLOGIC ABO: CPT

## 2023-08-25 PROCEDURE — 73560 X-RAY EXAM OF KNEE 1 OR 2: CPT | Mod: RT

## 2023-08-25 PROCEDURE — 86901 BLOOD TYPING SEROLOGIC RH(D): CPT

## 2023-08-25 PROCEDURE — 73070 X-RAY EXAM OF ELBOW: CPT | Mod: LT

## 2023-08-25 PROCEDURE — 36415 COLL VENOUS BLD VENIPUNCTURE: CPT

## 2023-08-25 PROCEDURE — 305948 HCHG GREEN TRAUMA ACT PRE-NOTIFY NO CC

## 2023-08-25 PROCEDURE — 85027 COMPLETE CBC AUTOMATED: CPT

## 2023-08-25 PROCEDURE — 73100 X-RAY EXAM OF WRIST: CPT | Mod: LT

## 2023-08-25 PROCEDURE — 86850 RBC ANTIBODY SCREEN: CPT

## 2023-08-25 PROCEDURE — 99284 EMERGENCY DEPT VISIT MOD MDM: CPT

## 2023-08-25 PROCEDURE — 71045 X-RAY EXAM CHEST 1 VIEW: CPT

## 2023-08-25 PROCEDURE — 82077 ASSAY SPEC XCP UR&BREATH IA: CPT

## 2023-08-25 PROCEDURE — 80053 COMPREHEN METABOLIC PANEL: CPT

## 2023-08-25 RX ADMIN — Medication 6 ML: at 20:32
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
## 2023-10-23 ENCOUNTER — OFFICE VISIT (OUTPATIENT)
Dept: MEDICAL GROUP | Facility: MEDICAL CENTER | Age: 23
End: 2023-10-23
Attending: NURSE PRACTITIONER
Payer: MEDICAID

## 2023-10-23 VITALS
RESPIRATION RATE: 17 BRPM | DIASTOLIC BLOOD PRESSURE: 68 MMHG | WEIGHT: 187.7 LBS | OXYGEN SATURATION: 96 % | HEART RATE: 74 BPM | TEMPERATURE: 97.7 F | HEIGHT: 71 IN | SYSTOLIC BLOOD PRESSURE: 110 MMHG | BODY MASS INDEX: 26.28 KG/M2

## 2023-10-23 DIAGNOSIS — Z76.89 ENCOUNTER TO ESTABLISH CARE: ICD-10-CM

## 2023-10-23 DIAGNOSIS — Z11.3 ROUTINE SCREENING FOR STI (SEXUALLY TRANSMITTED INFECTION): ICD-10-CM

## 2023-10-23 DIAGNOSIS — Z11.59 NEED FOR HEPATITIS C SCREENING TEST: ICD-10-CM

## 2023-10-23 DIAGNOSIS — R73.09 ELEVATED GLUCOSE: ICD-10-CM

## 2023-10-23 DIAGNOSIS — Z13.0 SCREENING FOR ENDOCRINE, NUTRITIONAL, METABOLIC AND IMMUNITY DISORDER: ICD-10-CM

## 2023-10-23 DIAGNOSIS — Z13.6 SCREENING FOR CARDIOVASCULAR CONDITION: ICD-10-CM

## 2023-10-23 DIAGNOSIS — Z13.228 SCREENING FOR ENDOCRINE, NUTRITIONAL, METABOLIC AND IMMUNITY DISORDER: ICD-10-CM

## 2023-10-23 DIAGNOSIS — Z13.29 SCREENING FOR ENDOCRINE, NUTRITIONAL, METABOLIC AND IMMUNITY DISORDER: ICD-10-CM

## 2023-10-23 DIAGNOSIS — Z13.21 SCREENING FOR ENDOCRINE, NUTRITIONAL, METABOLIC AND IMMUNITY DISORDER: ICD-10-CM

## 2023-10-23 PROCEDURE — 99213 OFFICE O/P EST LOW 20 MIN: CPT | Performed by: FAMILY MEDICINE

## 2023-10-23 PROCEDURE — 99202 OFFICE O/P NEW SF 15 MIN: CPT | Performed by: NURSE PRACTITIONER

## 2023-10-23 PROCEDURE — 3074F SYST BP LT 130 MM HG: CPT | Performed by: NURSE PRACTITIONER

## 2023-10-23 PROCEDURE — 3078F DIAST BP <80 MM HG: CPT | Performed by: NURSE PRACTITIONER

## 2023-10-23 ASSESSMENT — PATIENT HEALTH QUESTIONNAIRE - PHQ9: CLINICAL INTERPRETATION OF PHQ2 SCORE: 0

## 2023-10-23 ASSESSMENT — FIBROSIS 4 INDEX: FIB4 SCORE: 0.37
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
## 2023-10-23 NOTE — ASSESSMENT & PLAN NOTE
Discussed health history and maintenance   Flu vaccine - Declined   Colon Ca screening - Not applicable   Preventative screening labs ordered - yearly screening labs including one time hep C and HIV ordered  Per protocol, he did have elevated glucose levels when he was in the er.  Completed [place] paperwork so he could return to work with no restrictions.

## 2024-05-06 ENCOUNTER — HOSPITAL ENCOUNTER (OUTPATIENT)
Facility: MEDICAL CENTER | Age: 24
End: 2024-05-06
Attending: PHYSICIAN ASSISTANT
Payer: MEDICAID

## 2024-05-06 ENCOUNTER — OFFICE VISIT (OUTPATIENT)
Dept: URGENT CARE | Facility: CLINIC | Age: 24
End: 2024-05-06
Payer: MEDICAID

## 2024-05-06 VITALS
RESPIRATION RATE: 16 BRPM | TEMPERATURE: 99.9 F | HEIGHT: 71 IN | HEART RATE: 104 BPM | WEIGHT: 196.3 LBS | DIASTOLIC BLOOD PRESSURE: 72 MMHG | SYSTOLIC BLOOD PRESSURE: 112 MMHG | OXYGEN SATURATION: 98 % | BODY MASS INDEX: 27.48 KG/M2

## 2024-05-06 DIAGNOSIS — Z20.2 ENCOUNTER FOR ASSESSMENT OF STD EXPOSURE: ICD-10-CM

## 2024-05-06 DIAGNOSIS — R50.9 FEVER, UNSPECIFIED FEVER CAUSE: ICD-10-CM

## 2024-05-06 DIAGNOSIS — J02.9 SORE THROAT: ICD-10-CM

## 2024-05-06 DIAGNOSIS — R11.2 NAUSEA AND VOMITING, UNSPECIFIED VOMITING TYPE: ICD-10-CM

## 2024-05-06 LAB
FLUAV RNA SPEC QL NAA+PROBE: NEGATIVE
FLUBV RNA SPEC QL NAA+PROBE: NEGATIVE
HETEROPH AB SER QL LA: NEGATIVE
POCT INT CON NEG: NEGATIVE
POCT INT CON POS: POSITIVE
RSV RNA SPEC QL NAA+PROBE: NEGATIVE
S PYO DNA SPEC NAA+PROBE: NOT DETECTED
SARS-COV-2 RNA RESP QL NAA+PROBE: NEGATIVE

## 2024-05-06 PROCEDURE — 99213 OFFICE O/P EST LOW 20 MIN: CPT | Performed by: PHYSICIAN ASSISTANT

## 2024-05-06 PROCEDURE — 3074F SYST BP LT 130 MM HG: CPT | Performed by: PHYSICIAN ASSISTANT

## 2024-05-06 PROCEDURE — 87637 SARSCOV2&INF A&B&RSV AMP PRB: CPT | Mod: QW | Performed by: PHYSICIAN ASSISTANT

## 2024-05-06 PROCEDURE — 87651 STREP A DNA AMP PROBE: CPT | Performed by: PHYSICIAN ASSISTANT

## 2024-05-06 PROCEDURE — 3078F DIAST BP <80 MM HG: CPT | Performed by: PHYSICIAN ASSISTANT

## 2024-05-06 PROCEDURE — 86308 HETEROPHILE ANTIBODY SCREEN: CPT | Performed by: PHYSICIAN ASSISTANT

## 2024-05-06 RX ORDER — ONDANSETRON 4 MG/1
4 TABLET, ORALLY DISINTEGRATING ORAL EVERY 6 HOURS PRN
Qty: 15 TABLET | Refills: 0 | Status: SHIPPED | OUTPATIENT
Start: 2024-05-06

## 2024-05-06 ASSESSMENT — ENCOUNTER SYMPTOMS: VOMITING: 1

## 2024-05-06 ASSESSMENT — FIBROSIS 4 INDEX: FIB4 SCORE: 0.37

## 2024-05-06 NOTE — PROGRESS NOTES
"Subjective:   Manny Cerna is a 23 y.o. male who presents for Vomiting (X 1 week, dizziness, vomiting, diarrhea, rash on body, fatigue, sore throat, headache, cough, congestion, runny nose)     This is a pleasant 23-year-old male who presents with nausea diarrhea vomiting chills fever fatigue ST x one and a half weeks  Temps to 101  Vomiting 1-2x/day  No known sick contacts.  No underlying respiratory illnesses.  Denies shortness of breath.  Denies severe abdominal pain.  No abdominal surgeries.  No bloody stools or emesis.  Is able to tolerate liquids.  Making adequate urine output    Patient also requests testing for STI.  Has had some unprotected sex and concern for exposure.  Denies dysuria or urethral discharge or joint pain.          Review of Systems   Gastrointestinal:  Positive for vomiting.       Medications:  This patient does not have an active medication from one of the medication groupers.    Allergies:             Patient has no known allergies.    Surgical History:         Past Surgical History:   Procedure Laterality Date    DENTAL EXTRACTION(S)      wizdom teeth       Past Social Hx:  Manny Cerna  reports that he has never smoked. He has quit using smokeless tobacco. He reports current alcohol use. He reports current drug use.     Past Family Hx:   Manny Cerna family history is not on file.       Problem list, medications, and allergies reviewed by myself today in Epic.     Objective:     /72 (BP Location: Left arm, Patient Position: Sitting, BP Cuff Size: Adult)   Pulse (!) 104   Temp 37.7 °C (99.9 °F) (Temporal)   Resp 16   Ht 1.803 m (5' 11\")   Wt 89 kg (196 lb 4.8 oz)   SpO2 98%   BMI 27.38 kg/m²     Physical Exam  Vitals and nursing note reviewed.   Constitutional:       General: He is not in acute distress.     Appearance: He is well-developed. He is ill-appearing. He is not toxic-appearing or diaphoretic.   HENT:      Head: Normocephalic. No right " periorbital erythema or left periorbital erythema.      Right Ear: Ear canal and external ear normal. No mastoid tenderness. Tympanic membrane is injected. Tympanic membrane is not perforated or bulging.      Left Ear: Ear canal and external ear normal. No mastoid tenderness. Tympanic membrane is injected. Tympanic membrane is not perforated or bulging.      Nose: Mucosal edema and rhinorrhea present.      Mouth/Throat:      Mouth: Mucous membranes are dry.      Pharynx: Uvula midline. Posterior oropharyngeal erythema present. No uvula swelling.   Eyes:      General: Vision grossly intact. No allergic shiner.     Conjunctiva/sclera: Conjunctivae normal.      Pupils: Pupils are equal, round, and reactive to light.   Cardiovascular:      Rate and Rhythm: Normal rate and regular rhythm.      Pulses: Normal pulses.      Heart sounds: Normal heart sounds. No murmur heard.  Pulmonary:      Effort: Pulmonary effort is normal. No tachypnea, accessory muscle usage, prolonged expiration or respiratory distress.      Breath sounds: Normal breath sounds and air entry. No decreased air movement. No decreased breath sounds, wheezing, rhonchi or rales.      Comments: Lungs clear to auscultation bilaterally, no rhonchi rales or wheezes  Abdominal:      Comments: Abdomen is soft without guarding or rebound.   Musculoskeletal:         General: Normal range of motion.      Cervical back: Normal range of motion and neck supple. No rigidity.   Lymphadenopathy:      Cervical: No cervical adenopathy.   Skin:     General: Skin is warm and dry.   Neurological:      Mental Status: He is alert and oriented to person, place, and time.   Psychiatric:         Behavior: Behavior is cooperative.       Results for orders placed or performed in visit on 05/06/24   POCT CEPHEID GROUP A STREP - PCR   Result Value Ref Range    POC Group A Strep, PCR Not Detected Not Detected, Invalid   POCT CEPHEID COV-2, FLU A/B, RSV - PCR   Result Value Ref Range     SARS-CoV-2 by PCR Negative Negative, Invalid    Influenza virus A RNA Negative Negative, Invalid    Influenza virus B, PCR Negative Negative, Invalid    RSV, PCR Negative Negative, Invalid   POCT Mononucleosis (mono)   Result Value Ref Range    Heterophile Screen Negative Negative, Invalid    Internal Control Positive Positive     Internal Control Negative Negative        Assessment/Plan:     Diagnosis and Associated Orders:     1. Sore throat  - POCT CEPHEID GROUP A STREP - PCR  - POCT CEPHEID COV-2, FLU A/B, RSV - PCR  - POCT Mononucleosis (mono)    2. Fever, unspecified fever cause  - POCT CEPHEID GROUP A STREP - PCR  - POCT CEPHEID COV-2, FLU A/B, RSV - PCR    3. Encounter for assessment of STD exposure  - Chlamydia/GC, PCR (Urine); Future  - HIV AG/AB COMBO ASSAY SCREENING; Future  - T.PALLIDUM AB AKSHAT (SCREENING); Standing  - HSV 1/2 IGG W/ TYPE SPECIFIC RFLX; Future    4. Nausea and vomiting, unspecified vomiting type  - ondansetron (ZOFRAN ODT) 4 MG TABLET DISPERSIBLE; Take 1 Tablet by mouth every 6 hours as needed for Nausea/Vomiting for up to 15 doses.  Dispense: 15 Tablet; Refill: 0        Comments/MDM:  Viral panel, strep, mono all negative.  Suspect viral etiology.  Possible associated viral gastroenteritis.  Zofran prescribed.  STI testing ordered.  Patient understands he will need to go to the lab for HIV syphilis testing.  He is also requesting HSV testing.  Vital signs today stable and reassuring with low-grade fever.  Red flags return precautions discussed at length.  I personally reviewed prior external notes and test results pertinent to today's visit. Supportive care, natural history, differential diagnoses, and indications for immediate follow-up discussed. Return to clinic or go to ED if symptoms worsen or persist.  Red flag symptoms discussed.  Patient/Parent/Guardian voices understanding. Follow-up with your primary care provider in 3-5 days.  All side effects of medication discussed  including allergic response, GI upset, tendon injury, rash, sedation etc    Please note that this dictation was created using voice recognition software. I have made a reasonable attempt to correct obvious errors, but I expect that there are errors of grammar and possibly content that I did not discover before finalizing the note.    This note was electronically signed by Lian Barriga PA-C

## 2024-05-07 LAB
C TRACH DNA SPEC QL NAA+PROBE: NEGATIVE
N GONORRHOEA DNA SPEC QL NAA+PROBE: NEGATIVE
SPECIMEN SOURCE: NORMAL

## 2024-05-09 ASSESSMENT — VISUAL ACUITY: OU: 1

## 2024-07-07 VITALS
DIASTOLIC BLOOD PRESSURE: 90 MMHG | RESPIRATION RATE: 16 BRPM | TEMPERATURE: 99 F | SYSTOLIC BLOOD PRESSURE: 142 MMHG | HEIGHT: 71 IN | WEIGHT: 182.1 LBS | BODY MASS INDEX: 25.49 KG/M2 | HEART RATE: 119 BPM | OXYGEN SATURATION: 99 %

## 2024-07-07 PROCEDURE — 302449 STATCHG TRIAGE ONLY (STATISTIC)

## 2024-07-07 ASSESSMENT — PAIN DESCRIPTION - PAIN TYPE: TYPE: ACUTE PAIN

## 2024-07-07 ASSESSMENT — FIBROSIS 4 INDEX: FIB4 SCORE: 0.37

## 2024-07-08 ENCOUNTER — HOSPITAL ENCOUNTER (EMERGENCY)
Facility: MEDICAL CENTER | Age: 24
End: 2024-07-08
Payer: MEDICAID

## 2024-07-26 ENCOUNTER — OFFICE VISIT (OUTPATIENT)
Dept: URGENT CARE | Facility: CLINIC | Age: 24
End: 2024-07-26
Payer: MEDICAID

## 2024-07-26 ENCOUNTER — APPOINTMENT (OUTPATIENT)
Dept: RADIOLOGY | Facility: IMAGING CENTER | Age: 24
End: 2024-07-26
Payer: MEDICAID

## 2024-07-26 ENCOUNTER — HOSPITAL ENCOUNTER (OUTPATIENT)
Facility: MEDICAL CENTER | Age: 24
End: 2024-07-26
Payer: MEDICAID

## 2024-07-26 VITALS
HEIGHT: 70 IN | HEART RATE: 74 BPM | OXYGEN SATURATION: 97 % | TEMPERATURE: 98.3 F | SYSTOLIC BLOOD PRESSURE: 122 MMHG | BODY MASS INDEX: 25.45 KG/M2 | WEIGHT: 177.8 LBS | DIASTOLIC BLOOD PRESSURE: 80 MMHG

## 2024-07-26 DIAGNOSIS — Z87.09 HISTORY OF ASTHMA: ICD-10-CM

## 2024-07-26 DIAGNOSIS — Z20.2 POSSIBLE EXPOSURE TO STD: ICD-10-CM

## 2024-07-26 DIAGNOSIS — R07.9 CHEST PAIN, UNSPECIFIED TYPE: ICD-10-CM

## 2024-07-26 DIAGNOSIS — E86.0 DEHYDRATION: ICD-10-CM

## 2024-07-26 LAB
APPEARANCE UR: CLEAR
BILIRUB UR STRIP-MCNC: NEGATIVE MG/DL
COLOR UR AUTO: YELLOW
GLUCOSE UR STRIP.AUTO-MCNC: NEGATIVE MG/DL
KETONES UR STRIP.AUTO-MCNC: NEGATIVE MG/DL
LEUKOCYTE ESTERASE UR QL STRIP.AUTO: NEGATIVE
NITRITE UR QL STRIP.AUTO: NEGATIVE
PH UR STRIP.AUTO: 5.5 [PH] (ref 5–8)
PROT UR QL STRIP: NEGATIVE MG/DL
RBC UR QL AUTO: NEGATIVE
SP GR UR STRIP.AUTO: >=1.03
UROBILINOGEN UR STRIP-MCNC: NORMAL MG/DL

## 2024-07-26 PROCEDURE — 81002 URINALYSIS NONAUTO W/O SCOPE: CPT

## 2024-07-26 PROCEDURE — 3074F SYST BP LT 130 MM HG: CPT

## 2024-07-26 PROCEDURE — 71046 X-RAY EXAM CHEST 2 VIEWS: CPT | Mod: TC

## 2024-07-26 PROCEDURE — 3079F DIAST BP 80-89 MM HG: CPT

## 2024-07-26 PROCEDURE — 99214 OFFICE O/P EST MOD 30 MIN: CPT | Mod: 25

## 2024-07-26 PROCEDURE — 87591 N.GONORRHOEAE DNA AMP PROB: CPT

## 2024-07-26 PROCEDURE — 87491 CHLMYD TRACH DNA AMP PROBE: CPT

## 2024-07-26 PROCEDURE — 93000 ELECTROCARDIOGRAM COMPLETE: CPT

## 2024-07-26 PROCEDURE — 94640 AIRWAY INHALATION TREATMENT: CPT

## 2024-07-26 RX ORDER — ALBUTEROL SULFATE 2.5 MG/3ML
2.5 SOLUTION RESPIRATORY (INHALATION) ONCE
Status: COMPLETED | OUTPATIENT
Start: 2024-07-26 | End: 2024-07-26

## 2024-07-26 RX ADMIN — ALBUTEROL SULFATE 2.5 MG: 2.5 SOLUTION RESPIRATORY (INHALATION) at 16:11

## 2024-07-26 ASSESSMENT — FIBROSIS 4 INDEX: FIB4 SCORE: 0.44

## 2024-07-28 RX ORDER — ALBUTEROL SULFATE 90 UG/1
2 AEROSOL, METERED RESPIRATORY (INHALATION) EVERY 6 HOURS PRN
Qty: 8.5 G | Refills: 0 | Status: SHIPPED | OUTPATIENT
Start: 2024-07-28